# Patient Record
Sex: FEMALE | Race: WHITE | NOT HISPANIC OR LATINO | URBAN - NONMETROPOLITAN AREA
[De-identification: names, ages, dates, MRNs, and addresses within clinical notes are randomized per-mention and may not be internally consistent; named-entity substitution may affect disease eponyms.]

---

## 2023-06-06 ENCOUNTER — OFFICE VISIT (OUTPATIENT)
Dept: URBAN - NONMETROPOLITAN AREA CLINIC 13 | Facility: CLINIC | Age: 48
End: 2023-06-06

## 2023-06-21 ENCOUNTER — LAB OUTSIDE AN ENCOUNTER (OUTPATIENT)
Dept: URBAN - NONMETROPOLITAN AREA CLINIC 2 | Facility: CLINIC | Age: 48
End: 2023-06-21

## 2023-06-21 ENCOUNTER — DASHBOARD ENCOUNTERS (OUTPATIENT)
Age: 48
End: 2023-06-21

## 2023-06-21 ENCOUNTER — OFFICE VISIT (OUTPATIENT)
Dept: URBAN - NONMETROPOLITAN AREA CLINIC 13 | Facility: CLINIC | Age: 48
End: 2023-06-21
Payer: COMMERCIAL

## 2023-06-21 ENCOUNTER — LAB OUTSIDE AN ENCOUNTER (OUTPATIENT)
Dept: URBAN - NONMETROPOLITAN AREA CLINIC 13 | Facility: CLINIC | Age: 48
End: 2023-06-21

## 2023-06-21 ENCOUNTER — WEB ENCOUNTER (OUTPATIENT)
Dept: URBAN - NONMETROPOLITAN AREA CLINIC 13 | Facility: CLINIC | Age: 48
End: 2023-06-21

## 2023-06-21 VITALS
HEART RATE: 72 BPM | HEIGHT: 63 IN | DIASTOLIC BLOOD PRESSURE: 76 MMHG | SYSTOLIC BLOOD PRESSURE: 117 MMHG | BODY MASS INDEX: 25.55 KG/M2 | WEIGHT: 144.2 LBS

## 2023-06-21 DIAGNOSIS — R10.84 GENERALIZED ABDOMINAL PAIN: ICD-10-CM

## 2023-06-21 DIAGNOSIS — Z12.11 COLON CANCER SCREENING: ICD-10-CM

## 2023-06-21 DIAGNOSIS — K92.1 HEMATOCHEZIA: ICD-10-CM

## 2023-06-21 DIAGNOSIS — R19.8 ALTERNATING CONSTIPATION AND DIARRHEA: ICD-10-CM

## 2023-06-21 LAB
A/G RATIO: 1.6
ALBUMIN: 4.5
ALKALINE PHOSPHATASE: 65
ALT (SGPT): 10
ANION GAP: 9
AST (SGOT): 16
BILIRUBIN TOTAL: 0.4
BLOOD UREA NITROGEN: 10
BUN / CREAT RATIO: 15
C-REACTIVE PROTEIN: 0.06
CALCIUM: 9.5
CHLORIDE: 107
CO2: 26
CREATININE, SERUM: 0.67
EGFR (CKD-EPI): >60
GLUCOSE: 88
HEMATOCRIT: 40.8
HEMOGLOBIN: 13.6
MEAN CORPUSCULAR HEMOGLOBIN CONC: 33.5
MEAN CORPUSCULAR HEMOGLOBIN: 34.1
MEAN CORPUSCULAR VOLUME: 101.8
MEAN PLATELET VOLUME: 8.6
PLATELET COUNT: 254
POTASSIUM: 4.7
PROTEIN TOTAL: 7.3
RED BLOOD CELL COUNT: 4
RED CELL DISTRIBUTION WIDTH: 13.9
SEDIMENTATION RATE, ERYTHROCYTE: 4
SODIUM: 137
WHITE BLOOD CELL COUNT: 5

## 2023-06-21 PROCEDURE — 99204 OFFICE O/P NEW MOD 45 MIN: CPT | Performed by: NURSE PRACTITIONER

## 2023-06-21 RX ORDER — POLYETHYLENE GLYCOL 3350, SODIUM SULFATE, SODIUM CHLORIDE, POTASSIUM CHLORIDE, ASCORBIC ACID, SODIUM ASCORBATE 140-9-5.2G
AS DIRECTED KIT ORAL
Qty: 1 BOX | Refills: 0 | OUTPATIENT
Start: 2023-06-21 | End: 2023-06-23

## 2023-06-21 RX ORDER — LEVOTHYROXINE SODIUM 50 UG/1
1 TABLET IN THE MORNING ON AN EMPTY STOMACH TABLET ORAL ONCE A DAY
Status: ACTIVE | COMMUNITY

## 2023-06-21 RX ORDER — HYOSCYAMINE SULFATE 0.12 MG/1
1 TABLET UNDER THE TONGUE AND ALLOW TO DISSOLVE EVERY 4-6 HRS AS NEEDED FOR ABDOMINAL PAIN/SPASM TABLET, ORALLY DISINTEGRATING ORAL
Qty: 90 | Refills: 3 | OUTPATIENT
Start: 2023-06-21 | End: 2023-10-19

## 2023-06-21 RX ORDER — METFORMIN HYDROCHLORIDE 500 MG/1
1 TABLET WITH A MEAL TABLET, FILM COATED ORAL ONCE A DAY
Status: ACTIVE | COMMUNITY

## 2023-06-21 NOTE — HPI-TODAY'S VISIT:
6/21/2023 Ms. Nicky Chino is a 48 year old female here for blood in her stool, abdominal pain, and irregular bowel movement. She was last seen in 2011 for similar issues. She had an EGD and colonoscopy at that time that were fairly normal. Over the last few months, her bowels have been alternating between constipation and diarrhea. She will have random abdominal pain. This is mainly LUQ but can also be RLQ. She has also seen a fairly amount of bright red blood in her stool. This is painless. She has a hx of internal hemorrhoids. She will sit in hot water and this will resolve. She denies any changes in her weight. She remains on metformin. She was hx of thyroid issues two years ago. CS

## 2023-06-26 LAB
GAMMAGLOBULIN; IGA: 203
INTERP CELIAC DISEASE: (no result)
TTG AB IGA: <1

## 2023-08-22 ENCOUNTER — OFFICE VISIT (OUTPATIENT)
Dept: URBAN - NONMETROPOLITAN AREA SURGERY CENTER 1 | Facility: SURGERY CENTER | Age: 48
End: 2023-08-22

## 2023-09-08 ENCOUNTER — TELEPHONE ENCOUNTER (OUTPATIENT)
Dept: URBAN - NONMETROPOLITAN AREA CLINIC 2 | Facility: CLINIC | Age: 48
End: 2023-09-08

## 2023-10-02 ENCOUNTER — OFFICE VISIT (OUTPATIENT)
Dept: URBAN - NONMETROPOLITAN AREA CLINIC 13 | Facility: CLINIC | Age: 48
End: 2023-10-02